# Patient Record
Sex: MALE | Race: BLACK OR AFRICAN AMERICAN | Employment: FULL TIME | URBAN - METROPOLITAN AREA
[De-identification: names, ages, dates, MRNs, and addresses within clinical notes are randomized per-mention and may not be internally consistent; named-entity substitution may affect disease eponyms.]

---

## 2020-09-02 ENCOUNTER — VIRTUAL VISIT (OUTPATIENT)
Dept: DIABETES SERVICES | Age: 60
End: 2020-09-02
Payer: COMMERCIAL

## 2020-09-02 DIAGNOSIS — E11.42 DIABETIC POLYNEUROPATHY ASSOCIATED WITH TYPE 2 DIABETES MELLITUS (HCC): ICD-10-CM

## 2020-09-02 PROCEDURE — G0108 DIAB MANAGE TRN  PER INDIV: HCPCS

## 2020-09-02 NOTE — PROGRESS NOTES
[facility] Program for Diabetes Health  Diabetes Self-Management Education   Pre-program Assessment    Reason for Referral: Follow-up DSMES  Referral Source: Valdemar Dow MD    Metric Patient responses (9/2/2020)   A1c  (Goal: 7%)     10.6% on  2/27/2020  Per patient, repeat A1C was 11-12 % on 8/27/2020   Healthy Eating     24-hour Dietary Recall:    6:30-7 am Breakfast: cereal Monika Plan) w/ milk or oatmeal/grits w/ toast, or gonzalez, eggs, toast, grits on Saturdays    10 am Snack: fruit or [employer] bar    12-1 pm Lunch: [employer] next door to [employer] & small fries or Chik-filomena-a chicken tenders or hot dog    5:30-6:30 pm Dinner: chicken noodle soup & grilled cheese sandwich or salmon w/ veggies or hot dog w/ pork & beans or chicken teriyaki w/ rice, egg roll or pizza (\"junk food Friday\")      Beverages: 4 oz diet cran-grape juice for breakfast, diet Coke w/ meals,  water (2-3 bottles/day)    Alcohol: 1-2 beers 2-3x/month, occasional scotch     Stage of change: Preparation    - Patient counts calories. - He tries to limit sugar to 2-6 g for cereal.  - He believes he may have a gluten intolerance that developed within the past 5 years. - He is lactose intolerance. Being Active     Physical Activity Vital Sign:  How many days during the past week have you performed physical activity where your heart beats faster and your breathing is harder than normal for 30 minutes or more? 3-4 days    How many days in a typical week do you perform activity such as this? 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 - 7,000-10,000 steps/day  - Goal is to increase exercise to 5 days per week. Monitoring Do you monitor your blood sugar? Yes    How often do you monitor? 1x/day    What are the range of readings? 250-315 mg/dL  Breakfast: 300 mg/dL      Do you know your last A1c measurement? Yes    Do you know the meaning of the A1c? Yes    Stage of change: Action     Taking Medications Medication Management:  Do you understand what your diabetes medications do? Yes    Can you afford your diabetes medications? Yes    How often do you miss doses of your diabetes medications? Patient is hesitant to add on more medications and wants to avoid polypharmacy, therefore, he is not regularly taking metformin & Actos. Current dosing:     Anti-hyperglycemic Agents  pioglitazone 30 mg daily (patient is not taking regularly)  metformin  mg daily with dinner (patient is not taking regularly)  glipizide 10 mg BID (taking daily)  Januvia 100 mg daily    Blood Pressure Management:  amlodipine 10 mg daily  Lisinopril-HCTZ 20-25 mg daily    Lipid Management:  atorvastatin 20 mg daily    Clot Prevention:  Key Anti-Platelet Anticoagulant Meds     The patient is on no antiplatelet meds or anticoagulants. Stage of change: Precontemplation     Healthy Coping Diabetes Skills, Confidence and Preparedness Index: Total score: n/a  Skills: n/a  Confidence: n/a  Preparedness: n/a    Stage of change: Preparation Unable to complete [facility] due to time constraints and needing to focus on patient's educational needs during the visit. - Patient sites a wide range of support to include [employer], , wife, children, men's group. Reducing Risks   Patient-reported diabetes complications:  none    Medical hx: HTN, GERD, HLD, ED, cataracts       Problem Solving Hypoglycemia Management:  What are signs and symptoms of hypoglycemia that you experience? Dizziness/light-headedness, Weakness    How do you prevent hypoglycemia? Consistent meals/snack times and reduced medications    How do you treat hypoglycemia? Pt reported being unaware of how to treat hypoglycemia    Hyperglycemia Management:  What are signs and symptoms of hyperglycemia that you experience?  Frequent urination, tingling in hands and feet    How can you prevent hyperglycemia? Maintain a healthy weight, Engage in regular physically active, Monitor blood glucose    Sick Day Management:  What do you do differently on sick days? Stay hydrated, Take diabetes medication as instructed by provider    Pattern Management:  Do you notice blood glucose patterns when you look at the readings in your meter or logbook? Yes    How do you use the blood glucose readings from your meter or logbook? Understand how body responds to meals    Stage of change: Preparation   Note: Content derived from the American Association of Diabetes Educators' Diabetes Education Curriculum: A Guide to Successful Self-Management (2nd edition)         Diabetes Educator Recommendations:   - Address diabetes self-care behaviors through education and support using AADE7 Curriculum. Pt to attend weekly individual sessions on reducing risks, healthy eating, being active, monitoring, taking medications, healthy coping and problem solving.     - Patient intends to focus on these diabetes self-care practices: Healthy eating, Physical activity, Taking medications and Problem solving (sick day management, pattern management, and hypoglycemia protocol)      - Pt to follow up with provider on the following: n/a       Diabetes Self-Management Education Follow-up Visit: 9/9/20 @ 8:15 am    [facility] for Telehealth:  Kennedy Bumpers is a 60 y.o. male being evaluated through a synchronous (real-time) audio-video technology platform, as a substitution for an in-person encounter, to address the healthcare issues mentioned above. I was in the office. The patient was at home. A caregiver was present when appropriate. The patient and/or his healthcare decision maker, is aware that this patient-initiated, Telehealth encounter on 9/2/2020 is a billable service, with coverage as determined by his insurance carrier.  He is aware that he may receive a bill and has provided verbal consent to proceed: Yes. This telehealth encounter occurred during the COVID-19 pandemic and public health emergency. Evaluation of the following organ systems was limited: Vitals/Constitutional/EENT/Resp/CV/GI//MS/Neuro/Skin/Heme-Lymph-Imm. Pursuant to the emergency declaration under the [address] [place] [address], [address] [place] [address] and the [employer] and [employer] Act, this Virtual Visit was conducted with patient's (and/or legal guardian's) consent, to reduce the risk of exposure to COVID-19 and provide necessary medical care. --Dejah Garcia RN on 9/2/2020 at 8:20 AM    An electronic signature was used to authenticate this note.  I was in the office for the appointment and time spent: 60 minutes

## 2020-09-09 ENCOUNTER — VIRTUAL VISIT (OUTPATIENT)
Dept: DIABETES SERVICES | Age: 60
End: 2020-09-09
Payer: COMMERCIAL

## 2020-09-09 DIAGNOSIS — E11.42 DIABETIC POLYNEUROPATHY ASSOCIATED WITH TYPE 2 DIABETES MELLITUS (HCC): ICD-10-CM

## 2020-09-09 PROCEDURE — G0108 DIAB MANAGE TRN  PER INDIV: HCPCS

## 2020-09-09 NOTE — PROGRESS NOTES
[facility] for Diabetes Health  Diabetes Self-Management Education   Education and Goal Plan for Session #1    AADE7 Self-Care Behaviors:  Behavior Education Completed (9/9/2020)   Healthy Eating   Not addressed during this session. Being Active   - Effects of exercise on blood glucose  - Goals for exercise  - Types of exercise  - Patient is highly motivated to establish an exercise routine.   - Discussed alternative options to joining a gym. He would like to begin to use his Wii and follow along with YouTube exercise videos. He will consider stair climbing in his home. - He will begin to take walks as well. Monitoring     Not addressed during this session. Taking Medications - Medication review   - Patient expressed concerns about polypharmacy. - Discussed the progressive nature of T2DM and the need to use multiple classes of medications to address defects, as well as the potential need for insulin despite lifestyle modifications. - Reviewed potential defects with bodily processes in T2DM and MOA of Actos, metformin, glipizide, and Januvia, as well as GLP-1 agonists and basal and bolus insulin. He is interested in exploring the option of starting an injectable, but would like to avoid insulin. Encouraged him to discuss medication with his PCP and reiterated that his DM may have progressed to the point of needing insulin. Healthy Coping Not addressed during this session. Reducing Risks Not addressed during this session. Problem Solving - Management of diabetes after tooth extraction   - Patient is preparing for dental work and had questions about blood sugars. Advised him to test bg more frequently, given that they may be higher than usual d/t stress/pain or lower than usual d/t not eating as much. Encouraged him to call his provider if he has any bg concerns.    Note: Content derived from the American Association of Diabetes Educators' Diabetes Education Curriculum: A Guide to Successful Self-Management (2nd edition)         Diabetes Educator Recommendations:     - Continue addressing diabetes self-care behaviors through education and support in AADE7 Curriculum individual sessions on reducing risks, healthy eating, being active, monitoring, taking medications, healthy coping and problem solving.     - Continue practicing knowledge and skills relating to being active and medications to improve diabetes self-management. - Patient's Identified SMART Goal(s): - Patient will walk or follow along with exercise videos on Mondays, Wednesdays, and Fridays from 6:00-6:30 am and on Saturdays from 8:00-9:00 am.     - Pt to follow up with provider on the following: n/a     Diabetes Self-Management Education Follow-up Visit: 9/23/20 @ 8:15 am, focus on meal planning and CHO counting. [facility] for Telehealth:    Johnson Madera is a 60 y.o. male being evaluated through a synchronous (real-time) audio-video technology platform, as a substitution for an in-person encounter, to address the healthcare issues mentioned above. I was in the office. The patient was at home. A caregiver was present when appropriate. The patient and/or his healthcare decision maker, is aware that this patient-initiated, Telehealth encounter on 9/9/2020 is a billable service, with coverage as determined by his insurance carrier. He is aware that he may receive a bill and has provided verbal consent to proceed: Yes. This telehealth encounter occurred during the COVID-19 pandemic and public health emergency. Evaluation of the following organ systems was limited: Vitals/Constitutional/EENT/Resp/CV/GI//MS/Neuro/Skin/Heme-Lymph-Imm.   Pursuant to the emergency declaration under the [address], [facility] waiver authority and the [employer] and [employer] Act, this Virtual Visit was conducted with patient's (and/or legal guardian's) consent, to reduce the risk of exposure to COVID-19 and provide necessary medical care. --Lindsey Diaz RN on 9/9/2020 at 9:16 AM    An electronic signature was used to authenticate this note.  I was in the office for the appointment and time spent: 60 minutes

## 2020-09-23 ENCOUNTER — VIRTUAL VISIT (OUTPATIENT)
Dept: DIABETES SERVICES | Age: 60
End: 2020-09-23
Payer: COMMERCIAL

## 2020-09-23 DIAGNOSIS — E11.42 DIABETIC POLYNEUROPATHY ASSOCIATED WITH TYPE 2 DIABETES MELLITUS (HCC): ICD-10-CM

## 2020-09-23 PROCEDURE — G0108 DIAB MANAGE TRN  PER INDIV: HCPCS

## 2020-09-23 NOTE — PROGRESS NOTES
[facility] Program for Diabetes Health  Diabetes Self-Management Education   Education and Goal Plan for Session #2    AADE7 Self-Care Behaviors:  Behavior Education Completed (9/23/2020)   Healthy Eating   - Impact of food on blood glucose levels  - Food sources of carbohydrates  - Meal size and portions  - Reading food labels  - Meal and snack timing  - Carbohydrate counting   - Heart healthy fats  - Reducing sodium  - Controlling portions of carbohydrates  - Comparing food choices     Being Active   - Goals for exercise  - Patient walked for 1-2 miles 3 days over the past week, almost reaching his goal to walk 4 days over the past week. - He was able to get his treadmill working. Monitoring     - Frequency of monitoring   - Patient has been testing BG 2 hr ppg and notices his numbers are about 30 points lower than previously. He is now seeing BG around 270s mg/dL. - Reviewed the benefits of testing BG before meals and at bedtime, alternating time of day in order to eliminate the need to test multiple times daily. Taking Medications - Medication review   - Patient has started back on metformin in the evening. Healthy Coping Not addressed during this session. Reducing Risks Not addressed during this session. Problem Solving Not addressed during this session. Note: Content derived from the American Association of Diabetes Educators' Diabetes Education Curriculum: A Guide to Successful Self-Management (2nd edition)         Diabetes Educator Recommendations:     - Continue addressing diabetes self-care behaviors through education and support in AADE7 Curriculum individual sessions on reducing risks, healthy eating, being active, monitoring, taking medications, healthy coping and problem solving.     - Continue practicing knowledge and skills relating to healthy eating and monitoring and being active and medications to improve diabetes self-management.      - Patient's Identified SMART Goal(s): - Test blood sugar once daily before breakfast, lunch, dinner, or bedtime.     - Pt to follow up with provider on the following: n/a     Diabetes Self-Management Education Follow-up Visit: 10/21/20 @ 8:15 am    [facility] for Telehealth:    Argelia Quijano is a 60 y.o. male being evaluated through a synchronous (real-time) audio-video technology platform, as a substitution for an in-person encounter, to address the healthcare issues mentioned above. I was in the office. The patient was at home. A caregiver was present when appropriate. The patient and/or his healthcare decision maker, is aware that this patient-initiated, Telehealth encounter on 9/23/2020 is a billable service, with coverage as determined by his insurance carrier. He is aware that he may receive a bill and has provided verbal consent to proceed: Yes. This telehealth encounter occurred during the COVID-19 pandemic and public health emergency. Evaluation of the following organ systems was limited: Vitals/Constitutional/EENT/Resp/CV/GI//MS/Neuro/Skin/Heme-Lymph-Imm. Pursuant to the emergency declaration under the [address] [place] [address], [address] [place] [address] authority and the [employer] and [employer] Act, this Virtual Visit was conducted with patient's (and/or legal guardian's) consent, to reduce the risk of exposure to COVID-19 and provide necessary medical care. --Crystal Guerrero RN on 9/23/2020 at 9:31 AM    An electronic signature was used to authenticate this note.  I was in the office for the appointment and time spent: 60 minutes

## 2020-10-21 ENCOUNTER — VIRTUAL VISIT (OUTPATIENT)
Dept: DIABETES SERVICES | Age: 60
End: 2020-10-21
Payer: COMMERCIAL

## 2020-10-21 DIAGNOSIS — E11.42 DIABETIC POLYNEUROPATHY ASSOCIATED WITH TYPE 2 DIABETES MELLITUS (HCC): ICD-10-CM

## 2020-10-21 PROCEDURE — G0108 DIAB MANAGE TRN  PER INDIV: HCPCS

## 2020-10-21 NOTE — PROGRESS NOTES
[employer] [facility] for Diabetes Health  Diabetes Self-Management Education   Education and Goal Plan for Session #3    AADE7 Self-Care Behaviors:  Behavior Education Completed (10/21/2020)   Healthy Eating   Education delivered in previous session.  - Patient has been working on making better food choices when eating out and in general he has been counting carbs and watching his intake. Being Active   Education delivered in previous session.  - Patient has been walking about 3 days per week. Monitoring     - Frequency of monitoring  - Blood glucose schedule  - Monitoring blood glucose trends per meter  Education delivered in previous session.   - Patient has been checking bg at various times of day. BG ranges 250-270 mg/dL. - He will continue checking FBG daily. Taking Medications - Medication review  - Medication schedule  Education delivered in previous session.   - He currently  takes Januvia 100 mg daily with breakfast, glipizide 10 mg daily with breakfast, & metformin  mg daily after dinner.  - Patient agrees to take glipizide as prescribed, 10 mg BID, since BG remains well above target. Healthy Coping Not addressed during this session. Reducing Risks Not addressed during this session. Problem Solving Education delivered in previous session.   - Patient will evaluate FBG over the next few weeks to determine if PM dose of glipizide is effective. Reviewed the fact that his pancreas is likely not able to make enough insulin on its own, therefore glipizide may not be effective. Patient is beginning to accept the fact that he may need insulin. Reiterated the dangers of allowing BG to remain in the 200s mg/dL.    Note: Content derived from the American Association of Diabetes Educators' Diabetes Education Curriculum: A Guide to Successful Self-Management (2nd edition)         Diabetes Educator Recommendations:     - Continue practicing knowledge and skills relating to healthy eating and monitoring, being active and medications and problem solving to improve diabetes self-management. - Patient's Identified SMART Goal(s): -Take glipizide 10 mg every evening after dinner with metformin dose and check fbg daily.  - In a few weeks, if fbg remains > 130 mg/dL, contact Dr. Henrique Mejía to discuss medication and possibly insulin. - Pt to follow up with provider on the following: Review bg log in a few weeks and discuss medications/insulin. Diabetes Self-Management Education Follow-up Visit: PRN. Patient will contact [address] [place] [address] to schedule an appointment in ~ 6 months. [facility] Emergency Adaptations for Telehealth:    Kannan Head is a 60 y.o. male being evaluated through a synchronous (real-time) audio-video technology platform, as a substitution for an in-person encounter, to address the healthcare issues mentioned above. I was in the office. The patient was at home. A caregiver was present when appropriate. The patient and/or his healthcare decision maker, is aware that this patient-initiated, Telehealth encounter on 10/21/2020 is a billable service, with coverage as determined by his insurance carrier. He is aware that he may receive a bill and has provided verbal consent to proceed: Yes. This telehealth encounter occurred during the COVID-19 pandemic and public health emergency. Evaluation of the following organ systems was limited: Vitals/Constitutional/EENT/Resp/CV/GI//MS/Neuro/Skin/Heme-Lymph-Imm. Pursuant to the emergency declaration under the [address] [place] [address], [address] [place] [address] authority and the [employer] and [employer] Act, this Virtual Visit was conducted with patient's (and/or legal guardian's) consent, to reduce the risk of exposure to COVID-19 and provide necessary medical care. --Fernandez Kinney RN on 10/21/2020 at 8:53 AM    An electronic signature was used to authenticate this note.  I was in the office for the appointment and time spent: 30 minutes